# Patient Record
Sex: FEMALE | Race: WHITE | URBAN - METROPOLITAN AREA
[De-identification: names, ages, dates, MRNs, and addresses within clinical notes are randomized per-mention and may not be internally consistent; named-entity substitution may affect disease eponyms.]

---

## 2019-06-20 ENCOUNTER — APPOINTMENT (RX ONLY)
Dept: URBAN - METROPOLITAN AREA CLINIC 132 | Facility: CLINIC | Age: 59
Setting detail: DERMATOLOGY
End: 2019-06-20

## 2019-06-20 DIAGNOSIS — L98.8 OTHER SPECIFIED DISORDERS OF THE SKIN AND SUBCUTANEOUS TISSUE: ICD-10-CM

## 2019-06-20 PROBLEM — E03.9 HYPOTHYROIDISM, UNSPECIFIED: Status: ACTIVE | Noted: 2019-06-20

## 2019-06-20 PROBLEM — M06.9 RHEUMATOID ARTHRITIS, UNSPECIFIED: Status: ACTIVE | Noted: 2019-06-20

## 2019-06-20 PROCEDURE — ? DYSPORT

## 2019-06-20 PROCEDURE — ? FILLERS

## 2019-06-20 ASSESSMENT — LOCATION DETAILED DESCRIPTION DERM
LOCATION DETAILED: LEFT INFERIOR FOREHEAD
LOCATION DETAILED: LEFT LATERAL FOREHEAD
LOCATION DETAILED: LEFT MEDIAL FOREHEAD
LOCATION DETAILED: LEFT SUPERIOR LATERAL MALAR CHEEK
LOCATION DETAILED: RIGHT SUPERIOR LATERAL MALAR CHEEK
LOCATION DETAILED: RIGHT FOREHEAD
LOCATION DETAILED: GLABELLA
LOCATION DETAILED: RIGHT INFERIOR FOREHEAD

## 2019-06-20 ASSESSMENT — LOCATION SIMPLE DESCRIPTION DERM
LOCATION SIMPLE: GLABELLA
LOCATION SIMPLE: RIGHT FOREHEAD
LOCATION SIMPLE: RIGHT CHEEK
LOCATION SIMPLE: LEFT CHEEK
LOCATION SIMPLE: LEFT FOREHEAD

## 2019-06-20 ASSESSMENT — LOCATION ZONE DERM: LOCATION ZONE: FACE

## 2019-06-20 NOTE — PROCEDURE: FILLERS
Price (Use Numbers Only, No Special Characters Or $): 467 Price (Use Numbers Only, No Special Characters Or $): 344

## 2019-06-20 NOTE — HPI: COSMETIC (DYSPORT)
Have You Had Dysport Before?: has had dysport
When Was Your Last Dysport Treatment?: 2018
Additional History: Pt refers previous treatment in the past.

## 2019-06-20 NOTE — PROCEDURE: FILLERS
Post-Care Instructions: Patient instructed to avoid significant movement or massage of the treated area. \\nAvoid strenuous exercise for 24 hours. \\nAvoid extensive sun or heat for 72 hours.\\nAvoid consuming excess amounts of alcohol or salts to avoid excess swelling. \\nIf you have swelling you may apply a cool compress for 5-10 minutes each hour.\\nUse Tylenol for discomfort.\\nTry to sleep face up and slightly elevated if you experience swelling.\\nTake Arnica to help the bruising and swelling, start at least 2 days prior to injections.\\nIf you have any further questions or concerns please call us at [phone] or [phone]. Post-Care Instructions: Patient instructed to avoid significant movement or massage of the treated area. \\nAvoid strenuous exercise for 24 hours. \\nAvoid extensive sun or heat for 72 hours.\\nAvoid consuming excess amounts of alcohol or salts to avoid excess swelling. \\nIf you have swelling you may apply a cool compress for 5-10 minutes each hour.\\nUse Tylenol for discomfort.\\nTry to sleep face up and slightly elevated if you experience swelling.\\nTake Arnica to help the bruising and swelling, start at least 2 days prior to injections.\\nIf you have any further questions or concerns please call us at [phone] or [phone].

## 2020-02-17 ENCOUNTER — APPOINTMENT (RX ONLY)
Dept: URBAN - METROPOLITAN AREA CLINIC 132 | Facility: CLINIC | Age: 60
Setting detail: DERMATOLOGY
End: 2020-02-17

## 2020-02-17 DIAGNOSIS — L57.8 OTHER SKIN CHANGES DUE TO CHRONIC EXPOSURE TO NONIONIZING RADIATION: ICD-10-CM

## 2020-02-17 DIAGNOSIS — D22 MELANOCYTIC NEVI: ICD-10-CM

## 2020-02-17 DIAGNOSIS — L98.8 OTHER SPECIFIED DISORDERS OF THE SKIN AND SUBCUTANEOUS TISSUE: ICD-10-CM

## 2020-02-17 DIAGNOSIS — D18.0 HEMANGIOMA: ICD-10-CM

## 2020-02-17 DIAGNOSIS — L82.1 OTHER SEBORRHEIC KERATOSIS: ICD-10-CM

## 2020-02-17 DIAGNOSIS — L81.4 OTHER MELANIN HYPERPIGMENTATION: ICD-10-CM

## 2020-02-17 PROBLEM — D48.5 NEOPLASM OF UNCERTAIN BEHAVIOR OF SKIN: Status: ACTIVE | Noted: 2020-02-17

## 2020-02-17 PROBLEM — D18.01 HEMANGIOMA OF SKIN AND SUBCUTANEOUS TISSUE: Status: ACTIVE | Noted: 2020-02-17

## 2020-02-17 PROBLEM — D22.5 MELANOCYTIC NEVI OF TRUNK: Status: ACTIVE | Noted: 2020-02-17

## 2020-02-17 PROCEDURE — ? ADDITIONAL NOTES

## 2020-02-17 PROCEDURE — ? BIOPSY BY SHAVE METHOD

## 2020-02-17 PROCEDURE — 99214 OFFICE O/P EST MOD 30 MIN: CPT | Mod: 25

## 2020-02-17 PROCEDURE — ? COUNSELING

## 2020-02-17 PROCEDURE — 11102 TANGNTL BX SKIN SINGLE LES: CPT

## 2020-02-17 PROCEDURE — ? DYSPORT

## 2020-02-17 PROCEDURE — ? FILLERS

## 2020-02-17 PROCEDURE — ? SUNSCREEN RECOMMENDATIONS

## 2020-02-17 PROCEDURE — ? FULL BODY SKIN EXAM

## 2020-02-17 ASSESSMENT — LOCATION DETAILED DESCRIPTION DERM
LOCATION DETAILED: LEFT LATERAL MALAR CHEEK
LOCATION DETAILED: LOWER STERNUM
LOCATION DETAILED: LEFT SUPERIOR UPPER BACK
LOCATION DETAILED: LEFT SUPERIOR MEDIAL MIDBACK
LOCATION DETAILED: RIGHT LATERAL SUPERIOR CHEST
LOCATION DETAILED: RIGHT MID-UPPER BACK
LOCATION DETAILED: LEFT INFERIOR UPPER BACK

## 2020-02-17 ASSESSMENT — LOCATION ZONE DERM
LOCATION ZONE: FACE
LOCATION ZONE: TRUNK

## 2020-02-17 ASSESSMENT — LOCATION SIMPLE DESCRIPTION DERM
LOCATION SIMPLE: LEFT UPPER BACK
LOCATION SIMPLE: RIGHT UPPER BACK
LOCATION SIMPLE: CHEST
LOCATION SIMPLE: LEFT CHEEK
LOCATION SIMPLE: LEFT LOWER BACK

## 2020-02-17 NOTE — PROCEDURE: FILLERS
Price (Use Numbers Only, No Special Characters Or $): 235 Price (Use Numbers Only, No Special Characters Or $): 738

## 2020-02-17 NOTE — HPI: COSMETIC (FILLERS)
Have You Had Fillers Before?: has had fillers
Additional History: Last fillers 06/2019 Juvederm ultra XC

## 2020-02-17 NOTE — HPI: COSMETIC (DYSPORT)
Have You Had Dysport Before?: has had dysport
When Was Your Last Dysport Treatment?: 06/20/2019
Additional History: \\nLast Dysport 06/2019 45 units total PS, GC and FH .

## 2020-02-17 NOTE — PROCEDURE: FILLERS
Post-Care Instructions: Patient instructed to avoid significant movement or massage of the treated area. \\nAvoid strenuous exercise for 24 hours. \\nAvoid extensive sun or heat for 72 hours.\\nAvoid consuming excess amounts of alcohol or salts to avoid excess swelling. \\nIf you have swelling you may apply a cool compress for 5-10 minutes each hour.\\nUse Tylenol for discomfort.\\nTry to sleep face up and slightly elevated if you experience swelling.\\nTake Arnica to help the bruising and swelling, start at least 2 days prior to injections.\\nIf you have any further questions or concerns please call us at [phone] or [phone]. Post-Care Instructions: Patient instructed to avoid significant movement or massage of the treated area. \\nAvoid strenuous exercise for 24 hours. \\nAvoid extensive sun or heat for 72 hours.\\nAvoid consuming excess amounts of alcohol or salts to avoid excess swelling. \\nIf you have swelling you may apply a cool compress for 5-10 minutes each hour.\\nUse Tylenol for discomfort.\\nTry to sleep face up and slightly elevated if you experience swelling.\\nTake Arnica to help the bruising and swelling, start at least 2 days prior to injections.\\nIf you have any further questions or concerns please call us at [phone] or [phone].
